# Patient Record
Sex: MALE | URBAN - METROPOLITAN AREA
[De-identification: names, ages, dates, MRNs, and addresses within clinical notes are randomized per-mention and may not be internally consistent; named-entity substitution may affect disease eponyms.]

---

## 2020-03-09 ENCOUNTER — APPOINTMENT (OUTPATIENT)
Dept: URBAN - METROPOLITAN AREA CLINIC 319 | Age: 85
Setting detail: DERMATOLOGY
End: 2020-03-09

## 2020-03-09 DIAGNOSIS — L82.1 OTHER SEBORRHEIC KERATOSIS: ICD-10-CM

## 2020-03-09 DIAGNOSIS — L85.3 XEROSIS CUTIS: ICD-10-CM

## 2020-03-09 DIAGNOSIS — L57.0 ACTINIC KERATOSIS: ICD-10-CM

## 2020-03-09 PROCEDURE — OTHER LIQUID NITROGEN: OTHER

## 2020-03-09 PROCEDURE — 17004 DESTROY PREMAL LESIONS 15/>: CPT

## 2020-03-09 PROCEDURE — OTHER TREATMENT REGIMEN: OTHER

## 2020-03-09 PROCEDURE — OTHER COUNSELING: OTHER

## 2020-03-09 PROCEDURE — OTHER REASSURANCE: OTHER

## 2020-03-09 PROCEDURE — 99203 OFFICE O/P NEW LOW 30 MIN: CPT | Mod: 25

## 2020-03-09 ASSESSMENT — LOCATION SIMPLE DESCRIPTION DERM
LOCATION SIMPLE: LEFT FOREARM
LOCATION SIMPLE: RIGHT FOREHEAD
LOCATION SIMPLE: LEFT UPPER BACK
LOCATION SIMPLE: LEFT WRIST
LOCATION SIMPLE: LEFT FOREHEAD
LOCATION SIMPLE: RIGHT EAR
LOCATION SIMPLE: LEFT ZYGOMA
LOCATION SIMPLE: RIGHT UPPER BACK
LOCATION SIMPLE: RIGHT PRETIBIAL REGION
LOCATION SIMPLE: NOSE
LOCATION SIMPLE: LEFT EAR
LOCATION SIMPLE: RIGHT FOREARM
LOCATION SIMPLE: LEFT HAND

## 2020-03-09 ASSESSMENT — LOCATION ZONE DERM
LOCATION ZONE: TRUNK
LOCATION ZONE: ARM
LOCATION ZONE: HAND
LOCATION ZONE: EAR
LOCATION ZONE: LEG
LOCATION ZONE: NOSE
LOCATION ZONE: FACE

## 2020-03-09 ASSESSMENT — LOCATION DETAILED DESCRIPTION DERM
LOCATION DETAILED: LEFT ULNAR DORSAL HAND
LOCATION DETAILED: LEFT CENTRAL ZYGOMA
LOCATION DETAILED: RIGHT INFERIOR LATERAL UPPER BACK
LOCATION DETAILED: RIGHT FOREHEAD
LOCATION DETAILED: RIGHT SUPERIOR UPPER BACK
LOCATION DETAILED: RIGHT SUPERIOR HELIX
LOCATION DETAILED: LEFT LATERAL FOREHEAD
LOCATION DETAILED: LEFT RADIAL DORSAL HAND
LOCATION DETAILED: LEFT MEDIAL DORSAL WRIST
LOCATION DETAILED: LEFT SUPERIOR HELIX
LOCATION DETAILED: LEFT MEDIAL ZYGOMA
LOCATION DETAILED: RIGHT MEDIAL PROXIMAL PRETIBIAL REGION
LOCATION DETAILED: LEFT INFERIOR LATERAL FOREHEAD
LOCATION DETAILED: RIGHT PROXIMAL DORSAL FOREARM
LOCATION DETAILED: LEFT INFERIOR UPPER BACK
LOCATION DETAILED: NASAL TIP
LOCATION DETAILED: RIGHT INFERIOR FOREHEAD
LOCATION DETAILED: LEFT PROXIMAL DORSAL FOREARM

## 2020-03-09 NOTE — PROCEDURE: LIQUID NITROGEN
Number Of Freeze-Thaw Cycles: 1 freeze-thaw cycle
Detail Level: Detailed
Render Note In Bullet Format When Appropriate: No
Duration Of Freeze Thaw-Cycle (Seconds): 5
Post-Care Instructions: Apply Vaseline to soothe as needed for treated area(s)
Render Post-Care Instructions In Note?: yes

## 2020-03-09 NOTE — PROCEDURE: TREATMENT REGIMEN
Otc Regimen: Sunscreen spf 30+ daily
Detail Level: Zone
Otc Regimen: CeraVe moisturizing cream/lotion

## 2020-06-03 ENCOUNTER — APPOINTMENT (OUTPATIENT)
Dept: URBAN - METROPOLITAN AREA CLINIC 319 | Age: 85
Setting detail: DERMATOLOGY
End: 2020-06-03

## 2020-06-03 DIAGNOSIS — L57.0 ACTINIC KERATOSIS: ICD-10-CM

## 2020-06-03 PROCEDURE — OTHER TREATMENT REGIMEN: OTHER

## 2020-06-03 PROCEDURE — OTHER COUNSELING: OTHER

## 2020-06-03 PROCEDURE — 17003 DESTRUCT PREMALG LES 2-14: CPT

## 2020-06-03 PROCEDURE — OTHER LIQUID NITROGEN: OTHER

## 2020-06-03 PROCEDURE — 17000 DESTRUCT PREMALG LESION: CPT

## 2020-06-03 PROCEDURE — OTHER PRESCRIPTION: OTHER

## 2020-06-03 RX ORDER — FLUOROURACIL 2 G/40G
CREAM TOPICAL
Qty: 1 | Refills: 1 | Status: ERX | COMMUNITY
Start: 2020-06-03

## 2020-06-03 ASSESSMENT — LOCATION DETAILED DESCRIPTION DERM
LOCATION DETAILED: RIGHT SUPERIOR OCCIPITAL SCALP
LOCATION DETAILED: LEFT SUPERIOR POSTERIOR PARIETAL SCALP
LOCATION DETAILED: LEFT CENTRAL TEMPLE
LOCATION DETAILED: LEFT PROXIMAL DORSAL FOREARM
LOCATION DETAILED: RIGHT LATERAL FOREHEAD
LOCATION DETAILED: RIGHT PROXIMAL DORSAL FOREARM
LOCATION DETAILED: MID-OCCIPITAL SCALP
LOCATION DETAILED: RIGHT CENTRAL FRONTAL SCALP
LOCATION DETAILED: RIGHT SUPERIOR PARIETAL SCALP
LOCATION DETAILED: POSTERIOR MID-PARIETAL SCALP
LOCATION DETAILED: RIGHT DISTAL DORSAL FOREARM
LOCATION DETAILED: LEFT DISTAL DORSAL FOREARM
LOCATION DETAILED: RIGHT SUPERIOR FOREHEAD

## 2020-06-03 ASSESSMENT — LOCATION SIMPLE DESCRIPTION DERM
LOCATION SIMPLE: RIGHT SCALP
LOCATION SIMPLE: RIGHT FOREHEAD
LOCATION SIMPLE: POSTERIOR SCALP
LOCATION SIMPLE: LEFT FOREARM
LOCATION SIMPLE: SCALP
LOCATION SIMPLE: LEFT TEMPLE
LOCATION SIMPLE: RIGHT FOREARM

## 2020-06-03 ASSESSMENT — LOCATION ZONE DERM
LOCATION ZONE: ARM
LOCATION ZONE: FACE
LOCATION ZONE: SCALP

## 2020-06-03 NOTE — PROCEDURE: TREATMENT REGIMEN
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
Otc Regimen: Apply aquaphor or vaseline during Efudex process
Detail Level: Zone

## 2020-06-03 NOTE — PROCEDURE: LIQUID NITROGEN
Detail Level: Detailed
Number Of Freeze-Thaw Cycles: 1 freeze-thaw cycle
Render Post-Care Instructions In Note?: yes
Duration Of Freeze Thaw-Cycle (Seconds): 5
Post-Care Instructions: Apply Vaseline to soothe as needed for treated area(s)
Render Note In Bullet Format When Appropriate: No

## 2020-09-01 ENCOUNTER — APPOINTMENT (OUTPATIENT)
Dept: URBAN - METROPOLITAN AREA CLINIC 319 | Age: 85
Setting detail: DERMATOLOGY
End: 2020-09-01

## 2020-09-01 DIAGNOSIS — L82.1 OTHER SEBORRHEIC KERATOSIS: ICD-10-CM

## 2020-09-01 PROCEDURE — OTHER REASSURANCE: OTHER

## 2020-09-01 PROCEDURE — 99213 OFFICE O/P EST LOW 20 MIN: CPT

## 2020-09-01 PROCEDURE — OTHER COUNSELING: OTHER

## 2020-09-01 ASSESSMENT — LOCATION DETAILED DESCRIPTION DERM
LOCATION DETAILED: RIGHT SUPERIOR FRONTAL SCALP
LOCATION DETAILED: RIGHT PROXIMAL DORSAL FOREARM
LOCATION DETAILED: LEFT SUPERIOR PARIETAL SCALP
LOCATION DETAILED: LEFT SUPERIOR LATERAL LOWER BACK
LOCATION DETAILED: LEFT SUPERIOR FRONTAL SCALP
LOCATION DETAILED: RIGHT LATERAL FOREHEAD
LOCATION DETAILED: RIGHT DISTAL RADIAL DORSAL FOREARM
LOCATION DETAILED: RIGHT PROXIMAL RADIAL DORSAL FOREARM
LOCATION DETAILED: LEFT SUPERIOR MEDIAL MIDBACK
LOCATION DETAILED: LEFT PROXIMAL DORSAL FOREARM

## 2020-09-01 ASSESSMENT — LOCATION ZONE DERM
LOCATION ZONE: TRUNK
LOCATION ZONE: ARM
LOCATION ZONE: SCALP
LOCATION ZONE: FACE

## 2020-09-01 ASSESSMENT — LOCATION SIMPLE DESCRIPTION DERM
LOCATION SIMPLE: RIGHT FOREHEAD
LOCATION SIMPLE: LEFT FOREARM
LOCATION SIMPLE: SCALP
LOCATION SIMPLE: RIGHT FOREARM
LOCATION SIMPLE: LEFT LOWER BACK

## 2021-03-02 ENCOUNTER — APPOINTMENT (OUTPATIENT)
Dept: URBAN - METROPOLITAN AREA CLINIC 319 | Age: 86
Setting detail: DERMATOLOGY
End: 2021-03-02

## 2021-03-02 DIAGNOSIS — L82.0 INFLAMED SEBORRHEIC KERATOSIS: ICD-10-CM

## 2021-03-02 DIAGNOSIS — L57.0 ACTINIC KERATOSIS: ICD-10-CM

## 2021-03-02 DIAGNOSIS — L30.1 DYSHIDROSIS [POMPHOLYX]: ICD-10-CM

## 2021-03-02 DIAGNOSIS — L57.8 OTHER SKIN CHANGES DUE TO CHRONIC EXPOSURE TO NONIONIZING RADIATION: ICD-10-CM

## 2021-03-02 PROCEDURE — OTHER COUNSELING: OTHER

## 2021-03-02 PROCEDURE — 17110 DESTRUCT B9 LESION 1-14: CPT

## 2021-03-02 PROCEDURE — 17003 DESTRUCT PREMALG LES 2-14: CPT | Mod: 59

## 2021-03-02 PROCEDURE — 99213 OFFICE O/P EST LOW 20 MIN: CPT | Mod: 25

## 2021-03-02 PROCEDURE — OTHER REASSURANCE: OTHER

## 2021-03-02 PROCEDURE — OTHER ORDER FOR PHOTODYNAMIC THERAPY: OTHER

## 2021-03-02 PROCEDURE — OTHER MIPS QUALITY: OTHER

## 2021-03-02 PROCEDURE — OTHER TREATMENT REGIMEN: OTHER

## 2021-03-02 PROCEDURE — OTHER LIQUID NITROGEN: OTHER

## 2021-03-02 PROCEDURE — 17000 DESTRUCT PREMALG LESION: CPT | Mod: 59

## 2021-03-02 PROCEDURE — OTHER PRESCRIPTION: OTHER

## 2021-03-02 RX ORDER — CLOBETASOL PROPIONATE 0.5 MG/G
OINTMENT TOPICAL
Qty: 1 | Refills: 2 | Status: ERX | COMMUNITY
Start: 2021-03-02

## 2021-03-02 ASSESSMENT — LOCATION ZONE DERM
LOCATION ZONE: NECK
LOCATION ZONE: FACE
LOCATION ZONE: TRUNK
LOCATION ZONE: ARM
LOCATION ZONE: HAND
LOCATION ZONE: SCALP

## 2021-03-02 ASSESSMENT — LOCATION DETAILED DESCRIPTION DERM
LOCATION DETAILED: RIGHT SUPERIOR PARIETAL SCALP
LOCATION DETAILED: LEFT RADIAL PALM
LOCATION DETAILED: LEFT RADIAL DORSAL HAND
LOCATION DETAILED: LEFT CENTRAL LATERAL NECK
LOCATION DETAILED: POSTERIOR MID-PARIETAL SCALP
LOCATION DETAILED: RIGHT DISTAL DORSAL FOREARM
LOCATION DETAILED: LEFT INFERIOR UPPER BACK
LOCATION DETAILED: LEFT PROXIMAL DORSAL FOREARM
LOCATION DETAILED: LEFT INFERIOR PREAURICULAR CHEEK
LOCATION DETAILED: LEFT DISTAL DORSAL FOREARM
LOCATION DETAILED: RIGHT RADIAL PALM
LOCATION DETAILED: RIGHT PROXIMAL DORSAL FOREARM
LOCATION DETAILED: RIGHT ULNAR DORSAL HAND
LOCATION DETAILED: RIGHT SUPERIOR LATERAL MIDBACK

## 2021-03-02 ASSESSMENT — LOCATION SIMPLE DESCRIPTION DERM
LOCATION SIMPLE: LEFT FOREARM
LOCATION SIMPLE: RIGHT FOREARM
LOCATION SIMPLE: RIGHT LOWER BACK
LOCATION SIMPLE: LEFT UPPER BACK
LOCATION SIMPLE: SCALP
LOCATION SIMPLE: LEFT HAND
LOCATION SIMPLE: POSTERIOR SCALP
LOCATION SIMPLE: RIGHT HAND
LOCATION SIMPLE: LEFT CHEEK
LOCATION SIMPLE: NECK

## 2021-03-02 NOTE — PROCEDURE: ORDER FOR PHOTODYNAMIC THERAPY
Debridement: Yes
Location: Arms and Hands
Photosensitizer: Ameluz
Detail Level: Simple
Face And Scalp Incubation Time: 1 Hour for the face and 2 Hours for the scalp
Face, Ears And  Scalp Incubation Time: 1 Hour
Feet Incubation Time: 2 Hours
Pdt Type: Blue Light
Consent: The procedure and risks were reviewed with the patient including but not limited to: burning, pigmentary changes, pain, blistering, scabbing, redness, and the possibility of needing numerous treatments. Strict photoprotection after the procedure was also discussed.
Frequency Of Pdt: Single Treatment

## 2021-03-02 NOTE — PROCEDURE: LIQUID NITROGEN
Render Note In Bullet Format When Appropriate: No
Duration Of Freeze Thaw-Cycle (Seconds): 5-10
Render Post-Care Instructions In Note?: yes
Number Of Freeze-Thaw Cycles: 1 freeze-thaw cycle
Detail Level: Detailed
Number Of Freeze-Thaw Cycles: 2 freeze-thaw cycles
Medical Necessity Clause: Growing and irritated- liquid nitrogen machine used by Teja Marti
Post-Care Instructions: Apply Vaseline to soothe as needed for treated area(s)
Medical Necessity Information: It is in your best interest to select a reason for this procedure from the list below. All of these items fulfill various CMS LCD requirements except the new and changing color options.
Post-Care Instructions: Apply Vaseline to treated area(s) three times a day to help soothe pain from freezing
Duration Of Freeze Thaw-Cycle (Seconds): 5

## 2021-03-02 NOTE — HPI: PREVENTATIVE SKIN CHECK
What Is The Reason For Today's Visit?: Preventative Skin Check
Additional History: Patient is here for a 6 month skin check, patient reports a few lesions that are rough and raised

## 2021-03-02 NOTE — PROCEDURE: TREATMENT REGIMEN
Detail Level: Detailed
Otc Regimen: CeraVe moisturizing cream daily
Initiate Treatment: clobetasol 0.05 % topical ointment Apply thin layer twice daily to affected areas on hands  until clear then 1 additional week.

## 2021-05-24 ENCOUNTER — APPOINTMENT (OUTPATIENT)
Dept: URBAN - METROPOLITAN AREA CLINIC 319 | Age: 86
Setting detail: DERMATOLOGY
End: 2021-05-24

## 2021-05-24 DIAGNOSIS — L57.0 ACTINIC KERATOSIS: ICD-10-CM

## 2021-05-24 DIAGNOSIS — L82.1 OTHER SEBORRHEIC KERATOSIS: ICD-10-CM

## 2021-05-24 PROCEDURE — 99212 OFFICE O/P EST SF 10 MIN: CPT | Mod: 25

## 2021-05-24 PROCEDURE — 17000 DESTRUCT PREMALG LESION: CPT

## 2021-05-24 PROCEDURE — OTHER REASSURANCE: OTHER

## 2021-05-24 PROCEDURE — OTHER ORDER FOR PHOTODYNAMIC THERAPY: OTHER

## 2021-05-24 PROCEDURE — OTHER LIQUID NITROGEN: OTHER

## 2021-05-24 PROCEDURE — OTHER COUNSELING: OTHER

## 2021-05-24 PROCEDURE — 17003 DESTRUCT PREMALG LES 2-14: CPT

## 2021-05-24 ASSESSMENT — LOCATION DETAILED DESCRIPTION DERM
LOCATION DETAILED: LEFT PROXIMAL DORSAL FOREARM
LOCATION DETAILED: RIGHT PROXIMAL DORSAL FOREARM
LOCATION DETAILED: RIGHT INFERIOR POSTAURICULAR SKIN
LOCATION DETAILED: RIGHT SUPERIOR LATERAL NECK
LOCATION DETAILED: LEFT MEDIAL FRONTAL SCALP

## 2021-05-24 ASSESSMENT — LOCATION SIMPLE DESCRIPTION DERM
LOCATION SIMPLE: NECK
LOCATION SIMPLE: LEFT FOREARM
LOCATION SIMPLE: LEFT SCALP
LOCATION SIMPLE: RIGHT FOREARM
LOCATION SIMPLE: SCALP

## 2021-05-24 ASSESSMENT — LOCATION ZONE DERM
LOCATION ZONE: SCALP
LOCATION ZONE: ARM
LOCATION ZONE: NECK

## 2021-05-24 NOTE — PROCEDURE: LIQUID NITROGEN
Post-Care Instructions: Apply Vaseline to soothe as needed for treated area(s)
Render Post-Care Instructions In Note?: yes
Number Of Freeze-Thaw Cycles: 1 freeze-thaw cycle
Duration Of Freeze Thaw-Cycle (Seconds): 5
Render Note In Bullet Format When Appropriate: No
Detail Level: Detailed

## 2021-05-24 NOTE — PROCEDURE: ORDER FOR PHOTODYNAMIC THERAPY
Face And Neck Incubation Time: 1 Hour
Hands Incubation Time: 2 Hours
Consent: The procedure and risks were reviewed with the patient including but not limited to: burning, pigmentary changes, pain, blistering, scabbing, redness, and the possibility of needing numerous treatments. Strict photoprotection after the procedure was also discussed.
Occlusion: Yes
Arms Incubation Time: 3 Hours
Detail Level: Simple
Location: Scalp
Face Incubation Time: 3 Hour
Face And Scalp Incubation Time: 1 Hour for the face and 2 Hours for the scalp
Photosensitizer: Ameluz
Frequency Of Pdt: Single Treatment
Pdt Type: Red Light

## 2021-07-09 ENCOUNTER — APPOINTMENT (OUTPATIENT)
Dept: URBAN - METROPOLITAN AREA CLINIC 319 | Age: 86
Setting detail: DERMATOLOGY
End: 2021-07-09

## 2021-07-09 DIAGNOSIS — L57.0 ACTINIC KERATOSIS: ICD-10-CM

## 2021-07-09 PROCEDURE — OTHER PDT: RED: OTHER

## 2021-07-09 PROCEDURE — 96574 DBRDMT PRMLG LES W/PDT: CPT

## 2021-07-09 ASSESSMENT — LOCATION ZONE DERM: LOCATION ZONE: SCALP

## 2021-07-09 ASSESSMENT — LOCATION DETAILED DESCRIPTION DERM: LOCATION DETAILED: LEFT SUPERIOR PARIETAL SCALP

## 2021-07-09 ASSESSMENT — LOCATION SIMPLE DESCRIPTION DERM: LOCATION SIMPLE: SCALP

## 2021-07-09 NOTE — PROCEDURE: PDT: RED
Who Performed The Pdt?: Performed by MD JOHNATHON, JACKIE or CODY with Pre-Procedure Debridement of Hyperkeratotic Lesions ([id number]) Who Performed The Pdt?: Performed by MD JOHNATHON, JACKIE or CODY with Pre-Procedure Debridement of Hyperkeratotic Lesions ([id number])

## 2021-11-29 ENCOUNTER — APPOINTMENT (OUTPATIENT)
Dept: URBAN - METROPOLITAN AREA CLINIC 319 | Age: 86
Setting detail: DERMATOLOGY
End: 2021-11-29

## 2021-11-29 DIAGNOSIS — L57.0 ACTINIC KERATOSIS: ICD-10-CM

## 2021-11-29 DIAGNOSIS — L82.1 OTHER SEBORRHEIC KERATOSIS: ICD-10-CM

## 2021-11-29 DIAGNOSIS — Z12.83 ENCOUNTER FOR SCREENING FOR MALIGNANT NEOPLASM OF SKIN: ICD-10-CM

## 2021-11-29 DIAGNOSIS — D18.0 HEMANGIOMA: ICD-10-CM

## 2021-11-29 DIAGNOSIS — D22 MELANOCYTIC NEVI: ICD-10-CM

## 2021-11-29 PROBLEM — D22.5 MELANOCYTIC NEVI OF TRUNK: Status: ACTIVE | Noted: 2021-11-29

## 2021-11-29 PROBLEM — D18.01 HEMANGIOMA OF SKIN AND SUBCUTANEOUS TISSUE: Status: ACTIVE | Noted: 2021-11-29

## 2021-11-29 PROCEDURE — OTHER PRESCRIPTION: OTHER

## 2021-11-29 PROCEDURE — OTHER COUNSELING: OTHER

## 2021-11-29 PROCEDURE — OTHER SUNSCREEN RECOMMENDATIONS: OTHER

## 2021-11-29 PROCEDURE — OTHER REASSURANCE: OTHER

## 2021-11-29 PROCEDURE — OTHER MIPS QUALITY: OTHER

## 2021-11-29 PROCEDURE — OTHER RECORDS REVIEWED: OTHER

## 2021-11-29 PROCEDURE — 99213 OFFICE O/P EST LOW 20 MIN: CPT

## 2021-11-29 PROCEDURE — OTHER ADDITIONAL NOTES: OTHER

## 2021-11-29 RX ORDER — AMMONIUM LACTATE 120 MG/G
CREAM TOPICAL
Qty: 385 | Refills: 3 | Status: ERX | COMMUNITY
Start: 2021-11-29

## 2021-11-29 ASSESSMENT — LOCATION ZONE DERM
LOCATION ZONE: SCALP
LOCATION ZONE: ARM
LOCATION ZONE: FACE
LOCATION ZONE: TRUNK

## 2021-11-29 ASSESSMENT — LOCATION DETAILED DESCRIPTION DERM
LOCATION DETAILED: LEFT INFERIOR FOREHEAD
LOCATION DETAILED: POSTERIOR MID-PARIETAL SCALP
LOCATION DETAILED: RIGHT DISTAL DORSAL FOREARM
LOCATION DETAILED: LEFT DISTAL DORSAL FOREARM
LOCATION DETAILED: LEFT PROXIMAL DORSAL FOREARM
LOCATION DETAILED: PERIUMBILICAL SKIN
LOCATION DETAILED: RIGHT SUPERIOR MEDIAL LOWER BACK
LOCATION DETAILED: RIGHT MID-UPPER BACK
LOCATION DETAILED: RIGHT PROXIMAL DORSAL FOREARM

## 2021-11-29 ASSESSMENT — LOCATION SIMPLE DESCRIPTION DERM
LOCATION SIMPLE: POSTERIOR SCALP
LOCATION SIMPLE: RIGHT LOWER BACK
LOCATION SIMPLE: RIGHT UPPER BACK
LOCATION SIMPLE: RIGHT FOREARM
LOCATION SIMPLE: LEFT FOREHEAD
LOCATION SIMPLE: LEFT FOREARM
LOCATION SIMPLE: ABDOMEN

## 2021-11-29 NOTE — PROCEDURE: ADDITIONAL NOTES
Per supervisor okay to close case UTR.  
Additional Notes: Patient would like to wait till after the holidays to get Efudex for his AK’s. We discussed possible biopsy on scalp and left temple. We will schedule a follow up
Detail Level: Simple
Render Risk Assessment In Note?: no

## 2021-11-29 NOTE — PROCEDURE: RECORDS REVIEWED
Number Of Unique Sources Reviewed: 1
Detail Level: Zone
Summary Of Other Records Reviewed: All pertinent clinical and/or referral notes, photographs, treatment regimens (to include current and previous medication dosages), current and past treatment plans, as well as any pertinent diagnostic and/or laboratory test results were reviewed during this visit to optimize both patient safety and efficacy of care.

## 2021-11-29 NOTE — HPI: ABOVE THE WAIST SKIN CHECK
How Severe Are Your Spot(S)?: mild
Additional History: Patient is here for an above the waist skin check with concerns on his arms

## 2022-02-17 ENCOUNTER — APPOINTMENT (OUTPATIENT)
Dept: URBAN - METROPOLITAN AREA CLINIC 319 | Age: 87
Setting detail: DERMATOLOGY
End: 2022-02-17

## 2022-02-17 DIAGNOSIS — D18.0 HEMANGIOMA: ICD-10-CM

## 2022-02-17 DIAGNOSIS — D49.2 NEOPLASM OF UNSPECIFIED BEHAVIOR OF BONE, SOFT TISSUE, AND SKIN: ICD-10-CM

## 2022-02-17 DIAGNOSIS — D22 MELANOCYTIC NEVI: ICD-10-CM

## 2022-02-17 DIAGNOSIS — Z12.83 ENCOUNTER FOR SCREENING FOR MALIGNANT NEOPLASM OF SKIN: ICD-10-CM

## 2022-02-17 DIAGNOSIS — L82.1 OTHER SEBORRHEIC KERATOSIS: ICD-10-CM

## 2022-02-17 PROBLEM — D18.01 HEMANGIOMA OF SKIN AND SUBCUTANEOUS TISSUE: Status: ACTIVE | Noted: 2022-02-17

## 2022-02-17 PROBLEM — D22.5 MELANOCYTIC NEVI OF TRUNK: Status: ACTIVE | Noted: 2022-02-17

## 2022-02-17 PROCEDURE — OTHER PHOTO-DOCUMENTATION: OTHER

## 2022-02-17 PROCEDURE — OTHER REASSURANCE: OTHER

## 2022-02-17 PROCEDURE — 11103 TANGNTL BX SKIN EA SEP/ADDL: CPT

## 2022-02-17 PROCEDURE — 11102 TANGNTL BX SKIN SINGLE LES: CPT

## 2022-02-17 PROCEDURE — 99213 OFFICE O/P EST LOW 20 MIN: CPT | Mod: 25

## 2022-02-17 PROCEDURE — OTHER SUNSCREEN RECOMMENDATIONS: OTHER

## 2022-02-17 PROCEDURE — OTHER BIOPSY BY SHAVE METHOD: OTHER

## 2022-02-17 PROCEDURE — OTHER MIPS QUALITY: OTHER

## 2022-02-17 PROCEDURE — OTHER COUNSELING: OTHER

## 2022-02-17 PROCEDURE — OTHER RECORDS REVIEWED: OTHER

## 2022-02-17 ASSESSMENT — LOCATION DETAILED DESCRIPTION DERM
LOCATION DETAILED: RIGHT SUPERIOR MEDIAL LOWER BACK
LOCATION DETAILED: RIGHT DISTAL DORSAL FOREARM
LOCATION DETAILED: LEFT FOREHEAD
LOCATION DETAILED: LEFT CENTRAL LATERAL NECK
LOCATION DETAILED: RIGHT MID-UPPER BACK
LOCATION DETAILED: RIGHT SUPERIOR PARIETAL SCALP
LOCATION DETAILED: PERIUMBILICAL SKIN

## 2022-02-17 ASSESSMENT — LOCATION SIMPLE DESCRIPTION DERM
LOCATION SIMPLE: ABDOMEN
LOCATION SIMPLE: SCALP
LOCATION SIMPLE: LEFT FOREHEAD
LOCATION SIMPLE: RIGHT LOWER BACK
LOCATION SIMPLE: RIGHT FOREARM
LOCATION SIMPLE: NECK
LOCATION SIMPLE: RIGHT UPPER BACK

## 2022-02-17 ASSESSMENT — LOCATION ZONE DERM
LOCATION ZONE: NECK
LOCATION ZONE: SCALP
LOCATION ZONE: TRUNK
LOCATION ZONE: ARM
LOCATION ZONE: FACE

## 2022-02-17 NOTE — PROCEDURE: BIOPSY BY SHAVE METHOD
Bill 46094 For Specimen Handling/Conveyance To Laboratory?: no
Dressing: Band-Aid
Anesthesia Volume In Cc (Will Not Render If 0): 0.5
Size Of Lesion In Cm: 0
Curettage Text: The wound bed was treated with curettage after the biopsy was performed.
Depth Of Biopsy: dermis
Electrodesiccation And Curettage Text: The wound bed was treated with electrodesiccation and curettage after the biopsy was performed.
Type Of Destruction Used: Curettage
Biopsy Method: Dermablade
Anesthesia Type: 1% lidocaine with epinephrine
Billing Type: Third-Party Bill
Notification Instructions: Patient will be notified of biopsy results. However, patient instructed to call the office if not contacted within 2 weeks.
Wound Care: Aquaphor
Was A Bandage Applied: Yes
Electrodesiccation Text: The wound bed was treated with electrodesiccation after the biopsy was performed.
Silver Nitrate Text: The wound bed was treated with silver nitrate after the biopsy was performed.
Consent: Written consent was obtained and risks were reviewed including but not limited to scarring, infection, bleeding, scabbing, incomplete removal, nerve damage and allergy to anesthesia.
Biopsy Type: H and E
Cryotherapy Text: The wound bed was treated with cryotherapy after the biopsy was performed.
Hemostasis: Wilmer's
Post-Care Instructions: I reviewed with the patient in detail post-care instructions. Patient is to keep the biopsy site dry overnight, and then apply bacitracin twice daily until healed. Patient may apply hydrogen peroxide soaks to remove any crusting.
Information: Selecting Yes will display possible errors in your note based on the variables you have selected. This validation is only offered as a suggestion for you. PLEASE NOTE THAT THE VALIDATION TEXT WILL BE REMOVED WHEN YOU FINALIZE YOUR NOTE. IF YOU WANT TO FAX A PRELIMINARY NOTE YOU WILL NEED TO TOGGLE THIS TO 'NO' IF YOU DO NOT WANT IT IN YOUR FAXED NOTE.
Detail Level: Detailed

## 2022-02-17 NOTE — PROCEDURE: MIPS QUALITY
Quality 111:Pneumonia Vaccination Status For Older Adults: Pneumococcal Vaccination Previously Received
Quality 130: Documentation Of Current Medications In The Medical Record: Current Medications Documented
Quality 110: Preventive Care And Screening: Influenza Immunization: Influenza Immunization Administered during Influenza season
Quality 226: Preventive Care And Screening: Tobacco Use: Screening And Cessation Intervention: Patient screened for tobacco use and is an ex/non-smoker
Detail Level: Detailed
Quality 431: Preventive Care And Screening: Unhealthy Alcohol Use - Screening: Patient not identified as an unhealthy alcohol user when screened for unhealthy alcohol use using a systematic screening method

## 2022-05-02 ENCOUNTER — APPOINTMENT (OUTPATIENT)
Dept: URBAN - METROPOLITAN AREA CLINIC 319 | Age: 87
Setting detail: DERMATOLOGY
End: 2022-05-03

## 2022-05-02 DIAGNOSIS — D485 NEOPLASM OF UNCERTAIN BEHAVIOR OF SKIN: ICD-10-CM

## 2022-05-02 PROBLEM — D48.5 NEOPLASM OF UNCERTAIN BEHAVIOR OF SKIN: Status: ACTIVE | Noted: 2022-05-02

## 2022-05-02 PROBLEM — C44.319 BASAL CELL CARCINOMA OF SKIN OF OTHER PARTS OF FACE: Status: ACTIVE | Noted: 2022-05-02

## 2022-05-02 PROBLEM — C44.622 SQUAMOUS CELL CARCINOMA OF SKIN OF RIGHT UPPER LIMB, INCLUDING SHOULDER: Status: ACTIVE | Noted: 2022-05-02

## 2022-05-02 PROBLEM — C44.42 SQUAMOUS CELL CARCINOMA OF SKIN OF SCALP AND NECK: Status: ACTIVE | Noted: 2022-05-02

## 2022-05-02 PROCEDURE — OTHER COUNSELING: OTHER

## 2022-05-02 PROCEDURE — OTHER BIOPSY BY SHAVE METHOD: OTHER

## 2022-05-02 PROCEDURE — 11102 TANGNTL BX SKIN SINGLE LES: CPT

## 2022-05-02 PROCEDURE — 99212 OFFICE O/P EST SF 10 MIN: CPT | Mod: 25

## 2022-05-02 PROCEDURE — OTHER PATHOLOGY DISCUSSION: OTHER

## 2022-05-02 ASSESSMENT — LOCATION ZONE DERM: LOCATION ZONE: ARM

## 2022-05-02 ASSESSMENT — LOCATION SIMPLE DESCRIPTION DERM: LOCATION SIMPLE: LEFT FOREARM

## 2022-05-02 ASSESSMENT — LOCATION DETAILED DESCRIPTION DERM: LOCATION DETAILED: LEFT DISTAL DORSAL FOREARM

## 2022-05-02 NOTE — HPI: SKIN LESION
Is This A New Presentation, Or A Follow-Up?: Skin Lesion
Additional History: Patient is following up on a biopsy of a lesion on his left temple, this came back as a basal cell carcinoma

## 2022-05-02 NOTE — PROCEDURE: BIOPSY BY SHAVE METHOD
Information: Selecting Yes will display possible errors in your note based on the variables you have selected. This validation is only offered as a suggestion for you. PLEASE NOTE THAT THE VALIDATION TEXT WILL BE REMOVED WHEN YOU FINALIZE YOUR NOTE. IF YOU WANT TO FAX A PRELIMINARY NOTE YOU WILL NEED TO TOGGLE THIS TO 'NO' IF YOU DO NOT WANT IT IN YOUR FAXED NOTE.
Notification Instructions: Patient will be notified of biopsy results. However, patient instructed to call the office if not contacted within 2 weeks.
Consent: Written consent was obtained and risks were reviewed including but not limited to scarring, infection, bleeding, scabbing, incomplete removal, nerve damage and allergy to anesthesia.
Depth Of Biopsy: dermis
Hide Topical Anesthesia?: No
Anesthesia Volume In Cc (Will Not Render If 0): 0.5
Cryotherapy Text: The wound bed was treated with cryotherapy after the biopsy was performed.
Post-Care Instructions: I reviewed with the patient in detail post-care instructions. Patient is to keep the biopsy site dry overnight, and then apply bacitracin twice daily until healed. Patient may apply hydrogen peroxide soaks to remove any crusting.
Dressing: Band-Aid
Curettage Text: The wound bed was treated with curettage after the biopsy was performed.
Additional Anesthesia Volume In Cc (Will Not Render If 0): 0
Was A Bandage Applied: Yes
Hemostasis: Wilmer's
Biopsy Type: H and E
Billing Type: Third-Party Bill
Silver Nitrate Text: The wound bed was treated with silver nitrate after the biopsy was performed.
Detail Level: Detailed
Biopsy Method: curette
Anesthesia Type: 1% lidocaine with epinephrine
Electrodesiccation And Curettage Text: The wound bed was treated with electrodesiccation and curettage after the biopsy was performed.
Electrodesiccation Text: The wound bed was treated with electrodesiccation after the biopsy was performed.
Wound Care: Vaseline
Type Of Destruction Used: Curettage

## 2023-10-24 NOTE — PROCEDURE: MIPS QUALITY
Statement Selected
Quality 110: Preventive Care And Screening: Influenza Immunization: Influenza Immunization Administered during Influenza season
Detail Level: Simple